# Patient Record
(demographics unavailable — no encounter records)

---

## 2019-06-28 NOTE — CT
EXAM:

Brain CT scan Without contrast:



HISTORY:

Altered mental status



COMPARISON:

None



FINDINGS:



Atrophy and chronic white matter ischemic change.

No focal mass or midline shift.

No intra or extra-axial hemorrhage.

The visualized sinuses and mastoids are clear of acute process.



IMPRESSION:

No mass or bleed or other significant acute intracranial process.



Reported By: Landon Oneill 

Electronically Signed:  6/28/2019 8:20 PM

## 2019-06-28 NOTE — RAD
EXAM: 

CHEST ONE VIEW



HISTORY:

Altered mental status.



COMPARISON:

None



FINDINGS:

Cardiac silhouette is magnified by projection. The pulmonary vasculature is within normal limits. The
 lungs are clear. There is bilateral glenohumeral osteoarthropathy.



IMPRESSION:

No acute cardiopulmonary process.



Reported By: bJ Lozada 

Electronically Signed:  6/28/2019 3:06 PM